# Patient Record
Sex: MALE | Race: OTHER
[De-identification: names, ages, dates, MRNs, and addresses within clinical notes are randomized per-mention and may not be internally consistent; named-entity substitution may affect disease eponyms.]

---

## 2019-01-06 ENCOUNTER — HOSPITAL ENCOUNTER (EMERGENCY)
Dept: HOSPITAL 12 - ER | Age: 34
Discharge: HOME | End: 2019-01-06
Payer: COMMERCIAL

## 2019-01-06 VITALS — BODY MASS INDEX: 25.2 KG/M2 | HEIGHT: 71 IN | WEIGHT: 180 LBS

## 2019-01-06 DIAGNOSIS — F14.10: ICD-10-CM

## 2019-01-06 DIAGNOSIS — F17.200: ICD-10-CM

## 2019-01-06 DIAGNOSIS — F11.10: ICD-10-CM

## 2019-01-06 DIAGNOSIS — L02.415: Primary | ICD-10-CM

## 2019-01-06 DIAGNOSIS — F15.10: ICD-10-CM

## 2019-01-06 PROCEDURE — 96372 THER/PROPH/DIAG INJ SC/IM: CPT

## 2019-01-06 PROCEDURE — 99283 EMERGENCY DEPT VISIT LOW MDM: CPT

## 2019-01-06 PROCEDURE — A4663 DIALYSIS BLOOD PRESSURE CUFF: HCPCS

## 2019-01-06 PROCEDURE — 10060 I&D ABSCESS SIMPLE/SINGLE: CPT

## 2019-01-06 NOTE — NUR
Pt. ambulated into ED w/ c/o 8/10 pain to R groin, presents w/ 3cm red abscess 
to R groin x 2 days, no difficulty urinating, denies SOB/CP/HA/F/C/N/V/D,

## 2019-01-06 NOTE — NUR
Patient discharged to home in stable conditon.  Written and verbal after care 
instructions given. 

Patient verbalizes understanding of instructions. Pt. d/c per MD order w/ 
prescription, d/c papers signed, all belongings w/ pt., ambulated off unit w/ 
steady gait, no acute distress,

## 2019-01-13 ENCOUNTER — HOSPITAL ENCOUNTER (INPATIENT)
Dept: HOSPITAL 12 - ER | Age: 34
LOS: 2 days | Discharge: HOME | DRG: 720 | End: 2019-01-15
Payer: COMMERCIAL

## 2019-01-13 VITALS — SYSTOLIC BLOOD PRESSURE: 128 MMHG | DIASTOLIC BLOOD PRESSURE: 80 MMHG

## 2019-01-13 VITALS — HEIGHT: 71 IN | BODY MASS INDEX: 25.2 KG/M2 | WEIGHT: 180 LBS

## 2019-01-13 VITALS — SYSTOLIC BLOOD PRESSURE: 124 MMHG | DIASTOLIC BLOOD PRESSURE: 66 MMHG

## 2019-01-13 VITALS — DIASTOLIC BLOOD PRESSURE: 65 MMHG | SYSTOLIC BLOOD PRESSURE: 124 MMHG

## 2019-01-13 VITALS — DIASTOLIC BLOOD PRESSURE: 81 MMHG | SYSTOLIC BLOOD PRESSURE: 134 MMHG

## 2019-01-13 DIAGNOSIS — A41.9: Primary | ICD-10-CM

## 2019-01-13 DIAGNOSIS — L02.214: ICD-10-CM
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
## 2019-01-13 DIAGNOSIS — I10: ICD-10-CM

## 2019-01-13 DIAGNOSIS — T37.0X5A: ICD-10-CM
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
## 2019-01-13 DIAGNOSIS — N17.0: ICD-10-CM

## 2019-01-13 DIAGNOSIS — E87.1: ICD-10-CM

## 2019-01-13 DIAGNOSIS — K57.30: ICD-10-CM

## 2019-01-13 DIAGNOSIS — L02.415: ICD-10-CM

## 2019-01-13 DIAGNOSIS — F19.11: ICD-10-CM

## 2019-01-13 DIAGNOSIS — D69.6: ICD-10-CM

## 2019-01-13 DIAGNOSIS — E86.0: ICD-10-CM

## 2019-01-13 DIAGNOSIS — R31.9: ICD-10-CM

## 2019-01-13 DIAGNOSIS — F17.210: ICD-10-CM

## 2019-01-13 DIAGNOSIS — F10.11: ICD-10-CM

## 2019-01-13 LAB
ALP SERPL-CCNC: 131 U/L (ref 50–136)
ALT SERPL W/O P-5'-P-CCNC: 34 U/L (ref 16–63)
APPEARANCE UR: CLEAR
AST SERPL-CCNC: 26 U/L (ref 15–37)
BASOPHILS # BLD AUTO: 0 K/UL (ref 0–8)
BASOPHILS NFR BLD AUTO: 0.6 % (ref 0–2)
BILIRUB DIRECT SERPL-MCNC: 0.1 MG/DL (ref 0–0.2)
BILIRUB SERPL-MCNC: 0.3 MG/DL (ref 0.2–1)
BILIRUB UR QL STRIP: NEGATIVE
BUN SERPL-MCNC: 15 MG/DL (ref 7–18)
BUN SERPL-MCNC: 21 MG/DL (ref 7–18)
CHLORIDE SERPL-SCNC: 94 MMOL/L (ref 98–107)
CHLORIDE SERPL-SCNC: 99 MMOL/L (ref 98–107)
CO2 SERPL-SCNC: 24 MMOL/L (ref 21–32)
CO2 SERPL-SCNC: 24 MMOL/L (ref 21–32)
COLOR UR: YELLOW
CREAT SERPL-MCNC: 1.7 MG/DL (ref 0.6–1.3)
CREAT SERPL-MCNC: 1.9 MG/DL (ref 0.6–1.3)
DEPRECATED SQUAMOUS URNS QL MICRO: (no result) /HPF
EOSINOPHIL # BLD AUTO: 0.2 K/UL (ref 0–0.7)
EOSINOPHIL NFR BLD AUTO: 3 % (ref 0–7)
GLUCOSE SERPL-MCNC: 110 MG/DL (ref 74–106)
GLUCOSE SERPL-MCNC: 135 MG/DL (ref 74–106)
GLUCOSE UR STRIP-MCNC: NEGATIVE MG/DL
HCT VFR BLD AUTO: 42.3 % (ref 36.7–47.1)
HGB BLD-MCNC: 14.8 G/DL (ref 12.5–16.3)
HGB UR QL STRIP: NEGATIVE
KETONES UR STRIP-MCNC: (no result) MG/DL
LEUKOCYTE ESTERASE UR QL STRIP: NEGATIVE
LYMPHOCYTES # BLD AUTO: 0.4 K/UL (ref 20–40)
LYMPHOCYTES NFR BLD AUTO: 7 % (ref 20.5–51.5)
MCH RBC QN AUTO: 27.9 UUG (ref 23.8–33.4)
MCHC RBC AUTO-ENTMCNC: 35 G/DL (ref 32.5–36.3)
MCV RBC AUTO: 79.9 FL (ref 73–96.2)
MONOCYTES # BLD AUTO: 0.5 K/UL (ref 2–10)
MONOCYTES NFR BLD AUTO: 10.1 % (ref 0–11)
MUCOUS THREADS URNS QL MICRO: (no result) /LPF
NEUTROPHILS # BLD AUTO: 4.3 K/UL (ref 1.8–8.9)
NEUTROPHILS NFR BLD AUTO: 79.3 % (ref 38.5–71.5)
NITRITE UR QL STRIP: NEGATIVE
PH UR STRIP: 6 [PH] (ref 5–8)
PLATELET # BLD AUTO: 123 K/UL (ref 152–348)
POTASSIUM SERPL-SCNC: 4.2 MMOL/L (ref 3.5–5.1)
POTASSIUM SERPL-SCNC: 5.2 MMOL/L (ref 3.5–5.1)
RBC # BLD AUTO: 5.3 MIL/UL (ref 4.06–5.63)
RBC #/AREA URNS HPF: (no result) /HPF (ref 0–3)
SP GR UR STRIP: >=1.03 (ref 1–1.03)
UROBILINOGEN UR STRIP-MCNC: 1 E.U./DL
WBC # BLD AUTO: 5.4 K/UL (ref 3.6–10.2)
WBC #/AREA URNS HPF: (no result) /HPF
WBC #/AREA URNS HPF: (no result) /HPF (ref 0–3)
WS STN SPEC: 7.9 G/DL (ref 6.4–8.2)

## 2019-01-13 PROCEDURE — G0378 HOSPITAL OBSERVATION PER HR: HCPCS

## 2019-01-13 PROCEDURE — A4663 DIALYSIS BLOOD PRESSURE CUFF: HCPCS

## 2019-01-13 RX ADMIN — HYDROCODONE BITARTRATE AND ACETAMINOPHEN PRN TAB: 5; 325 TABLET ORAL at 21:04

## 2019-01-13 RX ADMIN — NICOTINE SCH MG: 21 PATCH, EXTENDED RELEASE TOPICAL at 15:04

## 2019-01-13 RX ADMIN — PANTOPRAZOLE SODIUM SCH MG: 40 TABLET, DELAYED RELEASE ORAL at 06:44

## 2019-01-13 RX ADMIN — SODIUM CHLORIDE PRN MLS/HR: 0.9 INJECTION, SOLUTION INTRAVENOUS at 17:25

## 2019-01-13 RX ADMIN — HYDROCODONE BITARTRATE AND ACETAMINOPHEN PRN TAB: 5; 325 TABLET ORAL at 06:45

## 2019-01-13 RX ADMIN — HYDROCODONE BITARTRATE AND ACETAMINOPHEN PRN TAB: 5; 325 TABLET ORAL at 15:04

## 2019-01-13 NOTE — NUR
Pt ambulated in ER with the c/o fever, generalized abdominal pain and headache 
x 3 days. Pt denies CP, SOB, N/V/D. VSS. Safe environment implemented.

## 2019-01-13 NOTE — NUR
ADMITTED 33M. DX: FEBRILE ILLNESS. IV ON THE LEFT AC IS INTACT AND PATENT. PRN PAIN 
MEDICATIONS GIVEN AND TOLERATED WELL. NO SIGNS OF ACUTE DISTRESS. SAFETY MEASURES GIVEN.

## 2019-01-13 NOTE — NUR
Noted right groin wound with redness.  Scant drainage of serous fluid.  No odor noted.  
Offered to cover small wound -pt refused.

## 2019-01-13 NOTE — NUR
Pt is in no acute distress.  Discussed plan of care with pt re: pain management,  fall 
precaution.   Call light is within reach.

## 2019-01-13 NOTE — NUR
RECEIVED PT AWAKE. ALERT AND ORIENTEDX4. PT SHOWS NO SIGNS OF ACUTE DISTRESS. PT IV INTACT 
AND PATENT. PT AFEBRILE. CALL LIGHT WITHIN REACH. BED ALARM ON AND IN LOW POSITION. SAFETY 
AND COMFORT PROVIDED. WILL CONTINUE TO MONITOR.

## 2019-01-14 VITALS — SYSTOLIC BLOOD PRESSURE: 115 MMHG | DIASTOLIC BLOOD PRESSURE: 73 MMHG

## 2019-01-14 VITALS — DIASTOLIC BLOOD PRESSURE: 60 MMHG | SYSTOLIC BLOOD PRESSURE: 100 MMHG

## 2019-01-14 VITALS — SYSTOLIC BLOOD PRESSURE: 100 MMHG | DIASTOLIC BLOOD PRESSURE: 60 MMHG

## 2019-01-14 VITALS — SYSTOLIC BLOOD PRESSURE: 119 MMHG | DIASTOLIC BLOOD PRESSURE: 73 MMHG

## 2019-01-14 VITALS — DIASTOLIC BLOOD PRESSURE: 72 MMHG | SYSTOLIC BLOOD PRESSURE: 119 MMHG

## 2019-01-14 VITALS — DIASTOLIC BLOOD PRESSURE: 73 MMHG | SYSTOLIC BLOOD PRESSURE: 119 MMHG

## 2019-01-14 LAB
ALP SERPL-CCNC: 108 U/L (ref 50–136)
ALT SERPL W/O P-5'-P-CCNC: 32 U/L (ref 16–63)
APPEARANCE UR: (no result)
AST SERPL-CCNC: 22 U/L (ref 15–37)
BASOPHILS # BLD AUTO: 0 K/UL (ref 0–8)
BASOPHILS NFR BLD AUTO: 0.5 % (ref 0–2)
BILIRUB SERPL-MCNC: 0.2 MG/DL (ref 0.2–1)
BILIRUB UR QL STRIP: NEGATIVE
BUN SERPL-MCNC: 21 MG/DL (ref 7–18)
CHLORIDE SERPL-SCNC: 100 MMOL/L (ref 98–107)
CHOLEST SERPL-MCNC: 110 MG/DL (ref ?–200)
CK SERPL-CCNC: 103 U/L (ref 39–308)
CO2 SERPL-SCNC: 25 MMOL/L (ref 21–32)
COLOR UR: YELLOW
CREAT SERPL-MCNC: 1.4 MG/DL (ref 0.6–1.3)
CREAT UR-MCNC: 182.1 MG/DL (ref 30–125)
DEPRECATED SQUAMOUS URNS QL MICRO: (no result) /HPF
EOSINOPHIL # BLD AUTO: 0.3 K/UL (ref 0–0.7)
EOSINOPHIL NFR BLD AUTO: 8.4 % (ref 0–7)
GLUCOSE SERPL-MCNC: 105 MG/DL (ref 74–106)
GLUCOSE UR STRIP-MCNC: (no result) MG/DL
HCT VFR BLD AUTO: 41.4 % (ref 36.7–47.1)
HDLC SERPL-MCNC: 24 MG/DL (ref 40–60)
HGB BLD-MCNC: 14.3 G/DL (ref 12.5–16.3)
HGB UR QL STRIP: NEGATIVE
KETONES UR STRIP-MCNC: (no result) MG/DL
LEUKOCYTE ESTERASE UR QL STRIP: NEGATIVE
LYMPHOCYTES # BLD AUTO: 0.7 K/UL (ref 20–40)
LYMPHOCYTES NFR BLD AUTO: 22.2 % (ref 20.5–51.5)
MAGNESIUM SERPL-MCNC: 1.8 MG/DL (ref 1.8–2.4)
MCH RBC QN AUTO: 27.9 UUG (ref 23.8–33.4)
MCHC RBC AUTO-ENTMCNC: 34 G/DL (ref 32.5–36.3)
MCV RBC AUTO: 80.9 FL (ref 73–96.2)
MONOCYTES # BLD AUTO: 0.5 K/UL (ref 2–10)
MONOCYTES NFR BLD AUTO: 14.1 % (ref 0–11)
MUCOUS THREADS URNS QL MICRO: (no result) /LPF
NEUTROPHILS # BLD AUTO: 1.8 K/UL (ref 1.8–8.9)
NEUTROPHILS NFR BLD AUTO: 54.8 % (ref 38.5–71.5)
NITRITE UR QL STRIP: NEGATIVE
PH UR STRIP: 6.5 [PH] (ref 5–8)
PHOSPHATE SERPL-MCNC: 3.1 MG/DL (ref 2.5–4.9)
PLATELET # BLD AUTO: 111 K/UL (ref 152–348)
POTASSIUM SERPL-SCNC: 4.3 MMOL/L (ref 3.5–5.1)
PROT UR-MCNC: 34.7 MG/DL
RBC # BLD AUTO: 5.11 MIL/UL (ref 4.06–5.63)
RBC #/AREA URNS HPF: (no result) /HPF (ref 0–3)
SP GR UR STRIP: >=1.03 (ref 1–1.03)
TRANS CELLS #/AREA URNS LPF: (no result) /LPF
TRIGL SERPL-MCNC: 114 MG/DL (ref 30–150)
TSH SERPL DL<=0.005 MIU/L-ACNC: 4.94 MIU/ML (ref 0.36–3.74)
URATE SERPL-MCNC: 3.7 MG/DL (ref 3.5–7.2)
UROBILINOGEN UR STRIP-MCNC: 1 E.U./DL
WBC # BLD AUTO: 3.3 K/UL (ref 3.6–10.2)
WBC #/AREA URNS HPF: (no result) /HPF
WBC #/AREA URNS HPF: (no result) /HPF (ref 0–3)
WS STN SPEC: 6.9 G/DL (ref 6.4–8.2)

## 2019-01-14 RX ADMIN — NICOTINE SCH MG: 21 PATCH, EXTENDED RELEASE TOPICAL at 08:52

## 2019-01-14 RX ADMIN — SODIUM CHLORIDE PRN MLS/HR: 0.9 INJECTION, SOLUTION INTRAVENOUS at 08:39

## 2019-01-14 RX ADMIN — SODIUM CHLORIDE PRN MLS/HR: 0.9 INJECTION, SOLUTION INTRAVENOUS at 06:05

## 2019-01-14 RX ADMIN — PANTOPRAZOLE SODIUM SCH MG: 40 TABLET, DELAYED RELEASE ORAL at 06:41

## 2019-01-14 NOTE — NUR
patient noted to have contraband items in room, cigarette lighter and 2 cigarettes, patient 
educated, removed items and placed in contraband, informed patient will be returned on 
discharge.

## 2019-01-14 NOTE — NUR
Received pt in up in bed, awake, alert and oriented x4. Discussed and reviewed plan of care 
with pt, pt cooperative. Heating pad placed on right inner thigh as directed. IV intact and 
patent. Pt denies pain, dizziness or SOB. No acute distress noted. Safety precautions in 
place, will continue to monitor.

## 2019-01-14 NOTE — NUR
CALLED DR ON CALL. PT REQUESTING FOR SLEEPING MEDICATION. DR. KIMBERLY HANCOCKDERED 
AMBIEN 5MG ONCE. PT STABLE WITH NO ACUTE DISTRESS. WILL CONTINUE TO MONITOR.

## 2019-01-14 NOTE — NUR
PT SLEPT INTERMITTENTLY. PT SHOWS NO ACUTE DISTRESS.IV INTACT AND PATENT.PT GIVEN NORCO FOR 
PAIN. PT TOLERATED IT WELL. PT AFEBRILE. PT VITAL SIGNS WITHIN NORMAL LIMIT. SAFETY AND 
COMFORT PROVIDED.WILL ENDORSE ACCORDINGLY TO INCOMING NURSE FOR CONTINUITY OF CARE.

## 2019-01-14 NOTE — NUR
PT REFUSED TO HAVE HIS IV FLUID BAG REPLACED FOR A NEW ONE. CHARGE NURSE AWARE. PT STABLE. 
SAFETY AND COMFORT PROVIDED. WILL ENDORSE ACCORDINGLY TO INCOMING NURSE FOR CONTINUITY OF 
CARE.

## 2019-01-14 NOTE — NUR
Notified Internist Emily Umaña, N.P. of patient refusal for IV hydration, no longer wants 
it refusing, per internist may discontinue IV hydration.

## 2019-01-15 VITALS — SYSTOLIC BLOOD PRESSURE: 123 MMHG | DIASTOLIC BLOOD PRESSURE: 74 MMHG

## 2019-01-15 VITALS — SYSTOLIC BLOOD PRESSURE: 100 MMHG | DIASTOLIC BLOOD PRESSURE: 50 MMHG

## 2019-01-15 RX ADMIN — PANTOPRAZOLE SODIUM SCH MG: 40 TABLET, DELAYED RELEASE ORAL at 06:31

## 2019-01-15 RX ADMIN — NICOTINE SCH MG: 21 PATCH, EXTENDED RELEASE TOPICAL at 08:44

## 2019-01-15 NOTE — NUR
PATIENT LAYING IN BED SLEEPING, EASILY AROUSABLE. NO SIGNS OF DISTRESS NOTED. PATIENT IS 
ALERT AND ORIENTED X4. APPLIED NICOTINE PATCH ON RIGHT UPPER ARM, PATIENT REPORTED PREVIOUS 
PATCH FELL OFF OF LEFT UPPER ARM AND WAS DISCARDED. ALL NEEDS ATTENDED, WILL CONTINUE TO 
MONITOR.

## 2019-01-15 NOTE — NUR
Pt slept well t/o the night. No fever noted on shift. VS stable. Denies pain, dizziness or 
chills. Offered warm compress during the night, pt refused. No acute distress noted. Safety 
precautions in place, call light within reach. Will endorse plan of care to day shift nurse.

## 2019-01-15 NOTE — NUR
patient discharged, left ambulatory accompanied and picked up by [facility] sob living 
manager. stable. wristband and IV removed.